# Patient Record
Sex: MALE | URBAN - METROPOLITAN AREA
[De-identification: names, ages, dates, MRNs, and addresses within clinical notes are randomized per-mention and may not be internally consistent; named-entity substitution may affect disease eponyms.]

---

## 2019-01-26 ENCOUNTER — TRANSFERRED RECORDS (OUTPATIENT)
Dept: HEALTH INFORMATION MANAGEMENT | Facility: CLINIC | Age: 39
End: 2019-01-26

## 2019-01-27 ENCOUNTER — HOSPITAL ENCOUNTER (INPATIENT)
Facility: CLINIC | Age: 39
LOS: 5 days | Discharge: HOME | DRG: 885 | End: 2019-02-01
Attending: PSYCHIATRY & NEUROLOGY | Admitting: PSYCHIATRY & NEUROLOGY
Payer: MEDICARE

## 2019-01-27 PROBLEM — F20.0 PARANOID SCHIZOPHRENIA (H): Status: ACTIVE | Noted: 2019-01-27

## 2019-01-27 PROCEDURE — 25000132 ZZH RX MED GY IP 250 OP 250 PS 637: Mod: GY | Performed by: PSYCHIATRY & NEUROLOGY

## 2019-01-27 PROCEDURE — A9270 NON-COVERED ITEM OR SERVICE: HCPCS | Mod: GY | Performed by: PSYCHIATRY & NEUROLOGY

## 2019-01-27 PROCEDURE — 99223 1ST HOSP IP/OBS HIGH 75: CPT | Mod: AI | Performed by: PSYCHIATRY & NEUROLOGY

## 2019-01-27 PROCEDURE — 12400001 ZZH R&B MH UMMC

## 2019-01-27 PROCEDURE — G0177 OPPS/PHP; TRAIN & EDUC SERV: HCPCS

## 2019-01-27 RX ORDER — ALUMINA, MAGNESIA, AND SIMETHICONE 2400; 2400; 240 MG/30ML; MG/30ML; MG/30ML
30 SUSPENSION ORAL EVERY 4 HOURS PRN
Status: DISCONTINUED | OUTPATIENT
Start: 2019-01-27 | End: 2019-02-01 | Stop reason: HOSPADM

## 2019-01-27 RX ORDER — BISACODYL 10 MG
10 SUPPOSITORY, RECTAL RECTAL DAILY PRN
Status: DISCONTINUED | OUTPATIENT
Start: 2019-01-27 | End: 2019-02-01 | Stop reason: HOSPADM

## 2019-01-27 RX ORDER — ACETAMINOPHEN 325 MG/1
650 TABLET ORAL EVERY 4 HOURS PRN
Status: DISCONTINUED | OUTPATIENT
Start: 2019-01-27 | End: 2019-02-01 | Stop reason: HOSPADM

## 2019-01-27 RX ORDER — TRAZODONE HYDROCHLORIDE 50 MG/1
50 TABLET, FILM COATED ORAL
Status: DISCONTINUED | OUTPATIENT
Start: 2019-01-27 | End: 2019-02-01 | Stop reason: HOSPADM

## 2019-01-27 RX ORDER — OLANZAPINE 10 MG/1
10 TABLET ORAL
Status: DISCONTINUED | OUTPATIENT
Start: 2019-01-27 | End: 2019-02-01 | Stop reason: HOSPADM

## 2019-01-27 RX ORDER — OLANZAPINE 10 MG/2ML
10 INJECTION, POWDER, FOR SOLUTION INTRAMUSCULAR
Status: DISCONTINUED | OUTPATIENT
Start: 2019-01-27 | End: 2019-02-01 | Stop reason: HOSPADM

## 2019-01-27 RX ORDER — HYDROXYZINE HYDROCHLORIDE 25 MG/1
25 TABLET, FILM COATED ORAL EVERY 4 HOURS PRN
Status: DISCONTINUED | OUTPATIENT
Start: 2019-01-27 | End: 2019-02-01 | Stop reason: HOSPADM

## 2019-01-27 RX ADMIN — NICOTINE POLACRILEX 2 MG: 2 GUM, CHEWING BUCCAL at 09:45

## 2019-01-27 RX ADMIN — NICOTINE POLACRILEX 2 MG: 2 GUM, CHEWING BUCCAL at 17:38

## 2019-01-27 RX ADMIN — NICOTINE POLACRILEX 2 MG: 2 GUM, CHEWING BUCCAL at 13:40

## 2019-01-27 RX ADMIN — NICOTINE POLACRILEX 4 MG: 2 GUM, CHEWING BUCCAL at 16:17

## 2019-01-27 RX ADMIN — NICOTINE POLACRILEX 2 MG: 2 GUM, CHEWING BUCCAL at 12:30

## 2019-01-27 RX ADMIN — NICOTINE POLACRILEX 4 MG: 2 GUM, CHEWING BUCCAL at 18:23

## 2019-01-27 RX ADMIN — NICOTINE POLACRILEX 2 MG: 2 GUM, CHEWING BUCCAL at 11:08

## 2019-01-27 ASSESSMENT — ACTIVITIES OF DAILY LIVING (ADL)
COGNITION: 0 - NO COGNITION ISSUES REPORTED
AMBULATION: 0-->INDEPENDENT
TRANSFERRING: 0-->INDEPENDENT
RETIRED_COMMUNICATION: 0-->UNDERSTANDS/COMMUNICATES WITHOUT DIFFICULTY
DRESS: 0-->INDEPENDENT
DRESS: INDEPENDENT
SWALLOWING: 0-->SWALLOWS FOODS/LIQUIDS WITHOUT DIFFICULTY
ORAL_HYGIENE: INDEPENDENT
HYGIENE/GROOMING: INDEPENDENT
BATHING: 0-->INDEPENDENT
TOILETING: 0-->INDEPENDENT
FALL_HISTORY_WITHIN_LAST_SIX_MONTHS: NO
RETIRED_EATING: 0-->INDEPENDENT

## 2019-01-27 ASSESSMENT — MIFFLIN-ST. JEOR
SCORE: 1857.92
SCORE: 1846.58

## 2019-01-27 NOTE — PROGRESS NOTES
The pt was calm with blunted affect, visible in the milieu, social with peers and staff. He endorses paranoid thoughts but states he feels safe in the hospital. Pt has some insight into his illness and paranoia. Denies depression, anxiety, hallucinations or SI/SIB/HI thoughts. His goal today was to let his parents know he is in the hospital.      01/27/19 1500   Behavioral Health   Hallucinations denies / not responding to hallucinations   Thinking paranoid   Orientation person: oriented;place: oriented   Memory baseline memory   Insight insight appropriate to situation   Judgement impaired   Eye Contact at examiner   Affect blunted, flat   Mood mood is calm   Physical Appearance/Attire attire appropriate to age and situation   Hygiene neglected grooming - unclean body, hair, teeth   Suicidality other (see comments)  (Denies)   1. Wish to be Dead No   2. Non-Specific Active Suicidal Thoughts  No   Self Injury other (see comment)  (Denies)   Elopement (Nothing stated or observed)   Activity other (see comment)  (Visible in the milieu, social with peers and staff)   Speech clear;coherent   Medication Sensitivity no stated side effects;no observed side effects   Psychomotor / Gait balanced;steady
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
## 2019-01-27 NOTE — PLAN OF CARE
Initially seen by OT on this date.  Kiran attended a relaxation yoga group this a.m. Appeared internally preoccupied. Quiet and tense. No active participation in the guided stretches and movements.  More observation needed to complete initial evaluation at this time.
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
## 2019-01-27 NOTE — PHARMACY-ADMISSION MEDICATION HISTORY
Admission medication history for the January 27, 2019 admission is complete.     Interview sources:  Patient    Reliability of source: Patient was a moderate historian, could remember medication and narrow down to a couple days of when his last dose was    Medication compliance: Good, patient goes into a clinic for his monthly injections    Preferred Outpatient Pharmacy: [facility]    Changes made to PTA medication list (reason)  Added: None  Deleted: None  Changed: None    Additional medication history information:   1. Paliperidone 156 mg/mL   Patient reports last receiving the injection on either the 1st or 2nd. He reported that his next dose was scheduled to be on January 31st. He stated he goes to [facility] for his injections.     Prior to Admission Medication List:  Prior to Admission medications    Medication Sig Last Dose Taking? Auth Provider   paliperidone (INVEGA SUSTENNA) 156 MG/ML SUSP injection Inject 156 mg into the muscle every 28 days 1/1/2019 at n/a Yes Reported, Patient       Time spent: 30 minutes    Medication history completed by:   Sarahy Lam, Pharmacy Intern

## 2019-01-27 NOTE — PROGRESS NOTES
01/27/19 0214   Patient Belongings   Did you bring any home meds/supplements to the hospital?  No   Patient Belongings locker;sent to security per site process   Patient Belongings Put in Hospital Secure Location (Security or Locker, etc.) wallet;watch;clothing;cash/credit card   Belongings Search Yes   Clothing Search Yes   Second Staff Ron BRENNAN     In Locker:  Pair of jeans with belt, gray sweater, gray T-shirt, pair of socks, wallet, mini composition book, key, pair of shoes, cell phone, jacket    Black gym bag containing pen, lighters (3), tobacco pipe, bag of tobacco, detergent, toothpaste (2), toothbrush, pink comb, charging cables (2), [employer] bag, blue pouch, pair of jeans (3), pairs of socks (9), solo sock,  2 long sleeve shirts, plaid button up shirts (2), bath towels (2), bundle of hand towel and wash cloths, kitchen towel, pair of shorts, T-Shirts (4), 2019 planner, cards (2), can of Planters Peanuts (3)    To Security:  Envelope [id number]  Saul $15.19  [employer] Mastercard [id number]  Arch Card [id number]  [employer] Watch    Contraband:  Large bag of tobacco    A               Admission:  I am responsible for any personal items that are not sent to the safe or pharmacy.  [facility] is not responsible for loss, theft or damage of any property in my possession.    Signature:  _________________________________ Date: _______  Time: _____                                              Staff Signature:  ____________________________ Date: ________  Time: _____      2nd Staff person, if patient is unable/unwilling to sign:    Signature: ________________________________ Date: ________  Time: _____     Discharge:  [facility] has returned all of my personal belongings:    Signature: _________________________________ Date: ________  Time: _____                                          Staff Signature:  ____________________________ Date: ________  Time: _____
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
## 2019-01-28 LAB
ALBUMIN SERPL-MCNC: 3.8 G/DL (ref 3.4–5)
ALP SERPL-CCNC: 74 U/L (ref 40–150)
ALT SERPL W P-5'-P-CCNC: 21 U/L (ref 0–70)
ANION GAP SERPL CALCULATED.3IONS-SCNC: 5 MMOL/L (ref 3–14)
AST SERPL W P-5'-P-CCNC: 17 U/L (ref 0–45)
BASOPHILS # BLD AUTO: 0 10E9/L (ref 0–0.2)
BASOPHILS NFR BLD AUTO: 0.5 %
BILIRUB SERPL-MCNC: 0.4 MG/DL (ref 0.2–1.3)
BUN SERPL-MCNC: 17 MG/DL (ref 7–30)
CALCIUM SERPL-MCNC: 9 MG/DL (ref 8.5–10.1)
CHLORIDE SERPL-SCNC: 108 MMOL/L (ref 94–109)
CHOLEST SERPL-MCNC: 158 MG/DL
CO2 SERPL-SCNC: 27 MMOL/L (ref 20–32)
CREAT SERPL-MCNC: 1.04 MG/DL (ref 0.66–1.25)
DIFFERENTIAL METHOD BLD: NORMAL
EOSINOPHIL # BLD AUTO: 0.1 10E9/L (ref 0–0.7)
EOSINOPHIL NFR BLD AUTO: 1.6 %
ERYTHROCYTE [DISTWIDTH] IN BLOOD BY AUTOMATED COUNT: 12.5 % (ref 10–15)
GFR SERPL CREATININE-BSD FRML MDRD: 89 ML/MIN/{1.73_M2}
GLUCOSE SERPL-MCNC: 95 MG/DL (ref 70–99)
HCT VFR BLD AUTO: 49.3 % (ref 40–53)
HDLC SERPL-MCNC: 43 MG/DL
HGB BLD-MCNC: 16.5 G/DL (ref 13.3–17.7)
IMM GRANULOCYTES # BLD: 0 10E9/L (ref 0–0.4)
IMM GRANULOCYTES NFR BLD: 0.2 %
LDLC SERPL CALC-MCNC: 87 MG/DL
LYMPHOCYTES # BLD AUTO: 2.1 10E9/L (ref 0.8–5.3)
LYMPHOCYTES NFR BLD AUTO: 32.2 %
MCH RBC QN AUTO: 30.1 PG (ref 26.5–33)
MCHC RBC AUTO-ENTMCNC: 33.5 G/DL (ref 31.5–36.5)
MCV RBC AUTO: 90 FL (ref 78–100)
MONOCYTES # BLD AUTO: 0.5 10E9/L (ref 0–1.3)
MONOCYTES NFR BLD AUTO: 7.1 %
NEUTROPHILS # BLD AUTO: 3.7 10E9/L (ref 1.6–8.3)
NEUTROPHILS NFR BLD AUTO: 58.4 %
NONHDLC SERPL-MCNC: 115 MG/DL
NRBC # BLD AUTO: 0 10*3/UL
NRBC BLD AUTO-RTO: 0 /100
PLATELET # BLD AUTO: 271 10E9/L (ref 150–450)
POTASSIUM SERPL-SCNC: 4.8 MMOL/L (ref 3.4–5.3)
PROT SERPL-MCNC: 7 G/DL (ref 6.8–8.8)
RBC # BLD AUTO: 5.49 10E12/L (ref 4.4–5.9)
SODIUM SERPL-SCNC: 140 MMOL/L (ref 133–144)
TRIGL SERPL-MCNC: 141 MG/DL
TSH SERPL DL<=0.005 MIU/L-ACNC: 1.63 MU/L (ref 0.4–4)
WBC # BLD AUTO: 6.4 10E9/L (ref 4–11)

## 2019-01-28 PROCEDURE — A9270 NON-COVERED ITEM OR SERVICE: HCPCS | Mod: GY | Performed by: PSYCHIATRY & NEUROLOGY

## 2019-01-28 PROCEDURE — 36415 COLL VENOUS BLD VENIPUNCTURE: CPT | Performed by: PSYCHIATRY & NEUROLOGY

## 2019-01-28 PROCEDURE — 85025 COMPLETE CBC W/AUTO DIFF WBC: CPT | Performed by: PSYCHIATRY & NEUROLOGY

## 2019-01-28 PROCEDURE — 99207 ZZC CDG-DOWN CODE MED NECESSITY: CPT | Performed by: PHYSICIAN ASSISTANT

## 2019-01-28 PROCEDURE — 12400001 ZZH R&B MH UMMC

## 2019-01-28 PROCEDURE — 80053 COMPREHEN METABOLIC PANEL: CPT | Performed by: PSYCHIATRY & NEUROLOGY

## 2019-01-28 PROCEDURE — 25000132 ZZH RX MED GY IP 250 OP 250 PS 637: Mod: GY | Performed by: PSYCHIATRY & NEUROLOGY

## 2019-01-28 PROCEDURE — 80061 LIPID PANEL: CPT | Performed by: PSYCHIATRY & NEUROLOGY

## 2019-01-28 PROCEDURE — 84443 ASSAY THYROID STIM HORMONE: CPT | Performed by: PSYCHIATRY & NEUROLOGY

## 2019-01-28 PROCEDURE — 99232 SBSQ HOSP IP/OBS MODERATE 35: CPT | Performed by: PSYCHIATRY & NEUROLOGY

## 2019-01-28 RX ADMIN — NICOTINE POLACRILEX 2 MG: 2 GUM, CHEWING BUCCAL at 12:44

## 2019-01-28 RX ADMIN — NICOTINE POLACRILEX 2 MG: 2 GUM, CHEWING BUCCAL at 07:46

## 2019-01-28 RX ADMIN — NICOTINE POLACRILEX 2 MG: 2 GUM, CHEWING BUCCAL at 14:17

## 2019-01-28 RX ADMIN — NICOTINE POLACRILEX 4 MG: 2 GUM, CHEWING BUCCAL at 16:25

## 2019-01-28 RX ADMIN — NICOTINE POLACRILEX 2 MG: 2 GUM, CHEWING BUCCAL at 09:25

## 2019-01-28 SDOH — HEALTH STABILITY: MENTAL HEALTH: HOW OFTEN DO YOU HAVE A DRINK CONTAINING ALCOHOL?: NEVER

## 2019-01-28 ASSESSMENT — ACTIVITIES OF DAILY LIVING (ADL)
ORAL_HYGIENE: INDEPENDENT
DRESS: INDEPENDENT
LAUNDRY: WITH SUPERVISION
ORAL_HYGIENE: INDEPENDENT
HYGIENE/GROOMING: INDEPENDENT
DRESS: INDEPENDENT
HYGIENE/GROOMING: INDEPENDENT

## 2019-01-28 NOTE — PHARMACY
Aroldo Pat is a 40 yo patient admitted to unit 10 in the [facility] for worsening anxiety and possible decompensation of schizophrenia.     Past Medical History:   Diagnosis Date     Schizophrenia (H)      Spoke with staff at [facility] (phone [phone] and fax [phone]) to verify dose of Invega Sustenna and last date of administration.    Invega Sustenna 156 mg IM given last on 01/02/19 so is due 01/30/19    Reviewed the following criteria for Aroldo:    The following conditions must be met to dispense Invega Sustenna:  1. Patient has been on Invega Sustenna at least 3 weeks prior to admission.  2. Patient has received both initiation doses.  3. Dose will be administered one week after the next scheduled outpatient maintenance dose is due.  4. The patient must still be hospitalized on the administration date determined by the pharmacy consult (i.e., one week after the next scheduled outpatient dose).  5. Order must include  dispense as written.   6. If six months or more have passed since the last maintenance dose, do not restart Invega Sustenna in the hospital, defer to outpatient treatment.    Nick meets the above conditions so Invega Sustenna will be scheduled one week after the due date of the outpatient dose (1/30/19) so will be ordered to be administered on 02/06/19.    Several notes reported issues with insurance coverage of Invega Sustenna outpatient. Aroldo confirmed he would have to pay a monthly co pay which he cannot afford. The [facility] reported Aroldo recently started coming to the clinic to get his Invega Sustenna injection and the co pay for the medication has been an issue from when he started attending the clinic (2-3 months prior to admission).    Christine Lee, PharmD, [facility]  Behavioral Health Pharmacist

## 2019-01-28 NOTE — PLAN OF CARE
"  OT General Care Plan  OT Frequency  1/28/2019 1500 - No Change by Christine Puentes, OT     Groups Attended: OT Clinic and Wellness groups for approximately x20 minutes each, observed only (no charge).     Affect/Hygiene/Presentation: Pt was pleasant and observed only for short spans of time, no apparent hygiene concerns. Flat affect.     Patient Performance/Response:  -Clinic: Pt passively participated in part of occupational therapy clinic. He indicated to writer that he wanted to work on a project, and that he likes to draw. However, as writer was assisting him to gather necessary materials, he indicated he would rather \"do what he needs to do first\". When writer asked what he meant, Pt noted that he needed to call his  prior to engaging in other activities. Pt completed self-assessment while present in clinic. Pt appeared comfortable minimally interacting writer when conversation was initiated with him, however he did not interact with peers.   -Wellness: Pt passively participated in part of a mental health management group with a focus on coping through movement to facilitate relaxation and stress management via chair yoga. Pt observed peers and writer engaging in yoga poses, however did not actively participate.     Plan: Will continue to assess, initial assessment and OT care plan/goals to be initiated upon additional group participation.       OT SELF-ASSESSMENT  Pt completed a self-assessment form this date. OT staff reviewed with Pt and explained the value of having them involved in their treatment plan, and provided options to meet current needs/self-identified goals.     What do you do during the day/evening?   \"Enjoy my time, coping skills, daily activities\"     What stressors do you face that trigger symptoms/use?  \"Money problems\"    Who are supportive people who you enjoy spending time with?  \"Parents, neighbors\"     What are your positive qualities/strengths?  None identified " "    What helps you to calm down or relax?  \"Cooking, exercise, music, hanging out with people\"    Please select coping skills that you would like to explore in OT:  -Physical wellness/exercise/yoga/Maltese chi  -Cooking/baking    What are your goals for when you leave the hospital?  None identified    Please select goals that you would like to work on in OT to reach your goal:  -Work on self-care to improve wellness  -Practice using coping strategies to manage stress and reduce symptoms  -Increase confidence and self-esteem     "

## 2019-01-28 NOTE — PROGRESS NOTES
Patient lost his MA because his SSDI payments increased.  His only Payor is Medicare A for hospitalization.  He asked me if the hospital would open up MA in [facility] so he could go to a place in [place] called [facility].  He has been paying to live at the [facility] in [place] prior to admit and his rent is due Sunday.  I let him know that our Business Office does not do MA applications as Medicare pays his hospitalization.  I told him that he needs to talk to Rakesh Rand, his [place] [employer]  to see if she could arrange a ride for him to return to [place] as we have no transportation for him.  If he wants [facility], he needs to work with Rakesh about getting MA reinstated and return to [place].

## 2019-01-28 NOTE — PLAN OF CARE
"Behavior Regulation Impairment (Disruptive Behavior)  Improved Impulse and Aggression Control (Disruptive Behavior)  1/28/2019 1436 - Improving   Flowsheets  Taken 1/28/2019 1436   Mutually Determined Action Steps (Promote Impulse/Aggression Control)  verbalizes insight re: need for meds  Promote Impulse and Aggression Control  1/28/2019 1436   Flowsheets  Taken 1/28/2019 1436   De-Escalation Techniques  diversional activity encouraged;appropriate behavior reinforced;physical activity promoted  Note  Patient has been visible in the milieu, minimally social with peers and staff. Has been pacing the halls for much of the shift. Affect is flat, blunted. Primary concern is access to invega sustenna following discharge as his insurance no longer covers this. States he knew he needed to ask for help when he was considering staying in a shelter for a few months to save money for his medication. States his mood is \"pretty good.\" Denies depression, anxiety, auditory and visual hallucinations, suicidal, homicidal ideation. States his only mental health symptom is \"frustration\" but he states this has improved since admission. No behavioral concerns this shift.      "

## 2019-01-28 NOTE — PROGRESS NOTES
Patient spent most to all shift withdrawn in p[place]on or room. His goal was to talk to him mom and he did.  Denies SI SIB Depression and Anxiety.  No pain and eating and sleeping fine.  Still admits to being paranoid about things.  No other concerns     01/27/19 2049   Behavioral Health   Hallucinations denies / not responding to hallucinations   Thinking distractable   Orientation time: oriented;date: oriented;place: oriented;person: oriented   Memory baseline memory   Insight insight appropriate to situation   Judgement impaired   Eye Contact at examiner   Affect blunted, flat   Mood mood is calm   Physical Appearance/Attire attire appropriate to age and situation   Hygiene neglected grooming - unclean body, hair, teeth   Suicidality other (see comments)  (denies)   1. Wish to be Dead No

## 2019-01-28 NOTE — PLAN OF CARE
Participants: Dr. Alonzo Naylor; Rosy LAWRENCE; Lyn Duff RN    Patient needs further stablization    New Patient    Medical: See Consult    Patient needs medication management and stabilization of his mood and other symptoms.  He will be encouraged to participate in groups when stable. CTC to ensure discharge appointments are in place.

## 2019-01-28 NOTE — CONSULTS
Internal Medicine Initial Visit      Aroldo Pat MRN# [id number]   YOB: 1980 Age: 39 year old   Date of Admission: 1/27/2019  PCP: No primary care provider on file.    Referring Provider: Behavioral Health - Arturo Melgoza MD  Reason for Visit: General Medical Evaluation          Assessment and Recommendations:   Aroldo Pat is a 39 year old year old man with a history of schizophrenia who is admitted to [facility] after transfer from [facility] in [place], [place] with worsening anxiety and possible decompensation of schizophrenia. Internal Medicine consultation was ordered by Dr. Arturo Melgoza for general medical evaluation.       Hx of paranoid schizophrenia  Anxiety: On PTA Paliperidone injection; last injection 12/30 per patient. Was transferred from [facility] in [place]. He reports increased frustration and stress due to his change in insurance, a current birthday, as well as less social support at home. No SI/HI. No hallucinations. CBC, CMP, TSH and Lipid panel WNL.  -Management per psychiatry team.     No further medical intervention is required at this time. Medicine signing off. Please feel free to call with any questions.     Courtney Causey PA-C  Hospitalist Service   Pager: [phone]     Reason for Admission:   Worsening anxiety and possible decompensation of schizophrenia          History of Present Illness:   History is obtained from the patient and medical record.     Aroldo Pat is a 39 year old year old man with a history of schizophrenia who is admitted to [facility] after transfer from [facility] in [place], [place] with worsening anxiety and possible decompensation of schizophrenia. Internal Medicine consultation was ordered by Dr. Arturo Melgoza for general medical evaluation.    Currently, patient denies any health concerns at this time. He states he is here because he was feeling  more frustrated and anxious due to his for[age] birthday, having less social support, not having an 'Ar[employer]' worker, his co-pays increasing and his insurance changing. He denies any SI/HI or hallicinations. He states that he was previously on paliperidone injections which are getting more expensive for him to pay for. He states he has gained 20-30lbs over the last few months due to inactivity. He cannot drive, so he states the winter and side walk conditions have made it difficult for him to be active.     Otherwise, he denies any chest pain, SOB, dizziness, lightheadedness, abdominal pain, nausea/vomiting or change in bowel/bladder function. He denies any previous medical problems including heart disease, HTN, HLD, diabetes or lung disease. He smokes 10 cigarettes per day for 20 years. Is wanting to quit for his [age] birthday. States the nicotine gum is helpful. He denies any other alcohol or illicit drug use.           Review of Systems:   Constitutional: negative for fever/chills or recent weight changes   Eyes: negative for vision changes   Ears/Nose/Throat: negative for ear pain, sore throat, nasal or sinus congestion   Cardiovascular: negative for chest pain or palpitations   Respiratory: negative for cough or shortness of breath   Gastrointestinal: negative for abdominal pain, N/V, diarrhea or constipation   Genitourinary: negative for dysuria, urinary urgency or frequency   Musculoskeletal: negative for joint pain   Neurologic: negative for headaches or numbness, tingling, weakness of extremities   Skin: negative for rashes or wounds   Endocrine: negative for polydipsia, polyphagia, polyuria; negative for heat/cold intolerance           Past Medical History:   Reviewed and updated in Epic.  Past Medical History:   Diagnosis Date     Schizophrenia (H)              Past Surgical History:   Reviewed and updated in Epic.  Past Surgical History:   Procedure Laterality Date     wisdom teeth extraction             "   Social History:     Social History     Tobacco Use     Smoking status: Current Every Day Smoker     Packs/day: 0.50     Years: 20.00     Pack years: 10.00     Types: Cigarettes   Substance Use Topics     Alcohol use: No     Frequency: Never     Drug use: No     Currently lives in [facility] in [place], [place] by himself. Has family nearby. Does not work.         Family History:   Reviewed and updated in Epic.  Family History   Adopted: Yes   Problem Relation Age of Onset     No Known Problems Mother      No Known Problems Father      No Known Problems Sister      No Known Problems Brother      No Known Problems Brother              Allergies:     Allergies   Allergen Reactions     Haloperidol              Medications:     Medications Prior to Admission   Medication Sig Dispense Refill Last Dose     paliperidone (INVEGA SUSTENNA) 156 MG/ML SUSP injection Inject 156 mg into the muscle every 28 days   1/1/2019 at n/a        Current Facility-Administered Medications   Medication     acetaminophen (TYLENOL) tablet 650 mg     alum & mag hydroxide-simethicone (MYLANTA ES/MAALOX  ES) suspension 30 mL     bisacodyl (DULCOLAX) Suppository 10 mg     hydrOXYzine (ATARAX) tablet 25 mg     magnesium hydroxide (MILK OF MAGNESIA) suspension 30 mL     nicotine (NICORETTE) gum 2-4 mg     OLANZapine (zyPREXA) tablet 10 mg    Or     OLANZapine (zyPREXA) injection 10 mg     [START ON 1/30/2019] paliperidone (INVEGA SUSTENNA) injection SUSP 156 mg     traZODone (DESYREL) tablet 50 mg            Physical Exam:   Blood pressure 115/70, pulse 71, temperature 97.8  F (36.6  C), temperature source Tympanic, resp. rate 16, height 1.778 m (5' 10\"), weight 93 kg (205 lb 1.6 oz), SpO2 96 %.  Body mass index is 29.43 kg/m .  GENERAL: Alert and oriented x 3. In no acute distress.   HEENT: Anicteric sclera. Mucous membranes moist.   CV: RRR. S1, S2. No murmurs appreciated.   RESPIRATORY: Effort normal on room air. Lungs CTAB with no " wheezing, rales, rhonchi.   GI: Abdomen soft, non distended, non tender. No guarding, rigidity or rebound tenderness.  MUSCULOSKELETAL: No joint swelling or tenderness.  NEUROLOGICAL: No focal deficits. Moves all extremities.   EXTREMITIES: No peripheral edema. Intact bilateral pedal pulses.   SKIN: No jaundice. No rashes.           Data:   CBC:  Recent Labs   Lab Test 01/28/19  0743   WBC 6.4   RBC 5.49   HGB 16.5   HCT 49.3   MCV 90   MCH 30.1   MCHC 33.5   RDW 12.5          CMP:  No results for input(s): NA, POTASSIUM, CHLORIDE, PALAK, CO2, BUN, CR, GLC, AST, ALT, BILITOTAL, ALBUMIN, PROTTOTAL, ALKPHOS in the last 12622 hours.    TSH:  TSH   Date Value Ref Range Status   01/28/2019 1.63 0.40 - 4.00 mU/L Final       Tox screen: n/a    Unresulted Labs Ordered in the Past 30 Days of this Admission     No orders found for last 61 day(s).

## 2019-01-28 NOTE — PROGRESS NOTES
"[facility], [place]   Psychiatric Progress Note        Interim History:   The patient's care was discussed with the treatment team during the daily team meeting and/or staff's chart notes were reviewed.  Staff report patient has been \"a little paranoid\" but wants to be here and receive treatment.     The patient reports that he feels that his Invega Sustenna is \"working.\" Some concerns about paying for it but does say that he will be able to afford it if he had better housing. Has been staying at an extended-stay hotel. Says that he was told to apply for an [employer] worker and look at a board and lodge. Does have a . Mood is \"a little frustrated\" due to the cost of Invega. Sleeping and eating well. Denies SI or HI. Denies AH or VH. Says that he hasn't been able to exercise as much as he would like to in his current living situation.          Medications:       [START ON 1/30/2019] paliperidone  156 mg Intramuscular Q28 Days          Allergies:     Allergies   Allergen Reactions     Haloperidol           Labs:     Recent Results (from the past 24 hour(s))   Lipid panel    Collection Time: 01/28/19  7:43 AM   Result Value Ref Range    Cholesterol 158 <200 mg/dL    Triglycerides 141 <150 mg/dL    HDL Cholesterol 43 >39 mg/dL    LDL Cholesterol Calculated 87 <100 mg/dL    Non HDL Cholesterol 115 <130 mg/dL   TSH with free T4 reflex    Collection Time: 01/28/19  7:43 AM   Result Value Ref Range    TSH 1.63 0.40 - 4.00 mU/L   Comprehensive metabolic panel    Collection Time: 01/28/19  7:43 AM   Result Value Ref Range    Sodium 140 133 - 144 mmol/L    Potassium 4.8 3.4 - 5.3 mmol/L    Chloride 108 94 - 109 mmol/L    Carbon Dioxide 27 20 - 32 mmol/L    Anion Gap 5 3 - 14 mmol/L    Glucose 95 70 - 99 mg/dL    Urea Nitrogen 17 7 - 30 mg/dL    Creatinine 1.04 0.66 - 1.25 mg/dL    GFR Estimate 89 >60 mL/min/[1.73_m2]    GFR Estimate If Black >90 >60 mL/min/[1.73_m2]    Calcium 9.0 8.5 - " "10.1 mg/dL    Bilirubin Total 0.4 0.2 - 1.3 mg/dL    Albumin 3.8 3.4 - 5.0 g/dL    Protein Total 7.0 6.8 - 8.8 g/dL    Alkaline Phosphatase 74 40 - 150 U/L    ALT 21 0 - 70 U/L    AST 17 0 - 45 U/L   CBC with platelets differential    Collection Time: 01/28/19  7:43 AM   Result Value Ref Range    WBC 6.4 4.0 - 11.0 10e9/L    RBC Count 5.49 4.4 - 5.9 10e12/L    Hemoglobin 16.5 13.3 - 17.7 g/dL    Hematocrit 49.3 40.0 - 53.0 %    MCV 90 78 - 100 fl    MCH 30.1 26.5 - 33.0 pg    MCHC 33.5 31.5 - 36.5 g/dL    RDW 12.5 10.0 - 15.0 %    Platelet Count 271 150 - 450 10e9/L    Diff Method Automated Method     % Neutrophils 58.4 %    % Lymphocytes 32.2 %    % Monocytes 7.1 %    % Eosinophils 1.6 %    % Basophils 0.5 %    % Immature Granulocytes 0.2 %    Nucleated RBCs 0 0 /100    Absolute Neutrophil 3.7 1.6 - 8.3 10e9/L    Absolute Lymphocytes 2.1 0.8 - 5.3 10e9/L    Absolute Monocytes 0.5 0.0 - 1.3 10e9/L    Absolute Eosinophils 0.1 0.0 - 0.7 10e9/L    Absolute Basophils 0.0 0.0 - 0.2 10e9/L    Abs Immature Granulocytes 0.0 0 - 0.4 10e9/L    Absolute Nucleated RBC 0.0           Psychiatric Examination:     /70   Pulse 71   Temp 97.8  F (36.6  C) (Tympanic)   Resp 16   Ht 1.778 m (5' 10\")   Wt 93 kg (205 lb 1.6 oz)   SpO2 96%   BMI 29.43 kg/m    Weight is 205 lbs 1.6 oz  Body mass index is 29.43 kg/m .  Orthostatic Vitals       Most Recent      Sitting Orthostatic /82 01/28 0748    Sitting Orthostatic Pulse (bpm) 80 01/28 0748    Standing Orthostatic /76 01/28 0748    Standing Orthostatic Pulse (bpm) 96 01/28 0748            Appearance: awake, alert and adequately groomed  Attitude:  cooperative  Eye Contact:  good  Mood:  \"a little frustrated\"  Affect:  intensity is flat  Speech:  clear, coherent  Psychomotor Behavior:  no evidence of tardive dyskinesia, dystonia, or tics  Thought Process:  goal oriented  Associations:  no loose associations  Thought Content:  no evidence of suicidal ideation or " homicidal ideation and no evidence of psychotic thought  Insight:  fair  Judgement:  fair  Oriented to:  time, person, and place  Attention Span and Concentration:  intact  Recent and Remote Memory:  intact    Clinical Global Impressions  First:  Considering your total clinical experience with this particular patient population, how severe are the patient's symptoms at this time?: 7 (01/27/19 0820)  Compared to the patient's condition at the START of treatment, this patient's condition is:: 4 (01/27/19 0820)  Most recent:  Considering your total clinical experience with this particular patient population, how severe are the patient's symptoms at this time?: 7 (01/27/19 0820)  Compared to the patient's condition at the START of treatment, this patient's condition is:: 4 (01/27/19 0820)         Precautions:     Behavioral Orders   Procedures     Code 1 - Restrict to Unit     Routine Programming     As clinically indicated     Status 15     Every 15 minutes.          Diagnoses:     Schizophrenia, chronic paranoid  Anxiety, unspecified             Plan:     1) Continue Invega Sustenna. Pharmacy can give if it is 7 days overdue (next Wednesday)  2) CTC to coordinate care with outpatient .       Disposition Plan   Reason for ongoing admission: poses an imminent risk to self  Discharge location: TBD  Discharge Medications: not ordered  Follow-up Appointments: not scheduled  Legal Status: voluntary  Entered by: Alonzo Naylor on 1/28/2019 at 10:38 AM

## 2019-01-29 PROCEDURE — 25000132 ZZH RX MED GY IP 250 OP 250 PS 637: Mod: GY | Performed by: PSYCHIATRY & NEUROLOGY

## 2019-01-29 PROCEDURE — A9270 NON-COVERED ITEM OR SERVICE: HCPCS | Mod: GY | Performed by: PSYCHIATRY & NEUROLOGY

## 2019-01-29 PROCEDURE — 99231 SBSQ HOSP IP/OBS SF/LOW 25: CPT | Performed by: PSYCHIATRY & NEUROLOGY

## 2019-01-29 PROCEDURE — 12400001 ZZH R&B MH UMMC

## 2019-01-29 RX ADMIN — NICOTINE POLACRILEX 2 MG: 2 GUM, CHEWING BUCCAL at 07:49

## 2019-01-29 RX ADMIN — NICOTINE POLACRILEX 4 MG: 2 GUM, CHEWING BUCCAL at 18:25

## 2019-01-29 RX ADMIN — NICOTINE POLACRILEX 2 MG: 2 GUM, CHEWING BUCCAL at 08:57

## 2019-01-29 RX ADMIN — NICOTINE POLACRILEX 4 MG: 2 GUM, CHEWING BUCCAL at 15:09

## 2019-01-29 RX ADMIN — NICOTINE POLACRILEX 2 MG: 2 GUM, CHEWING BUCCAL at 12:47

## 2019-01-29 RX ADMIN — NICOTINE POLACRILEX 2 MG: 2 GUM, CHEWING BUCCAL at 10:10

## 2019-01-29 RX ADMIN — NICOTINE POLACRILEX 2 MG: 2 GUM, CHEWING BUCCAL at 17:15

## 2019-01-29 ASSESSMENT — ACTIVITIES OF DAILY LIVING (ADL)
DRESS: STREET CLOTHES
ORAL_HYGIENE: INDEPENDENT
DRESS: SCRUBS (BEHAVIORAL HEALTH);STREET CLOTHES
LAUNDRY: WITH SUPERVISION
HYGIENE/GROOMING: INDEPENDENT
ORAL_HYGIENE: INDEPENDENT
HYGIENE/GROOMING: INDEPENDENT

## 2019-01-29 ASSESSMENT — MIFFLIN-ST. JEOR: SCORE: 1852.47

## 2019-01-29 NOTE — PROGRESS NOTES
Had call from the RN at the patient's mental health clinic in [place], [place].  She scheduled the patient to see Dr. Capps next Tuesday, 2/5 at 10:00am.  They are aware of the issue now with Mandi Callejas. Patient had been getting samples but the clinic has run out.  They will try to get more but will also discuss switching him to a medication he can afford.  RN said that she knows his , Rakesh, very well and will discuss the issues he is having with MA being closed and his living situation. Dr. Capps was surprised that [facility] would send him up here to [facility] where he has no support system and no way to get his medication.    RN from [facility] called back and gave me a number to have the patient call as soon as he arrives in [place].  The number is to the local crisis team.  They will pick him up and bring him directly to Dr. Capps's office.  Dr. Capps able to get more Invega Britta and wants to talk to patient about medications and try and assess what more he needs.

## 2019-01-29 NOTE — PROGRESS NOTES
Pt has been in the milieu.  He has been attending groups but has minimal interaction with peers.  Pt denies any mental health concerns.  Pt denies any SI or SIB thinking.  He reports he feels his medications are helping.  Pt anticipates discharge tomorrow.     01/29/19 1307   Behavioral Health   Hallucinations denies / not responding to hallucinations   Thinking distractable   Orientation person: oriented;place: oriented;date: oriented   Insight (fair)   Judgement (fair)   Eye Contact at examiner   Affect blunted, flat   Mood mood is calm   Physical Appearance/Attire attire appropriate to age and situation   Hygiene (adequate)   Suicidality (denies SI)   1. Wish to be Dead Yes   2. Non-Specific Active Suicidal Thoughts  No   Self Injury (denies SIB thinking)   Elopement (none observed)   Activity (in the milieu/some peer interaction/attended groups)   Speech clear;coherent   Psychomotor / Gait balanced;steady   Activities of Daily Living   Hygiene/Grooming independent   Oral Hygiene independent   Dress street clothes   Room Organization independent

## 2019-01-29 NOTE — PROGRESS NOTES
Team discussed the situation with patient being transferred to us from a [place] ED.  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  Left a message with his Co CM Rakesh Rand. Explained to patient that we can't open MA as Medicare A is covering his hospital bill and that he needs to work with [facility] to open up his MA which he said was closed because he earns too much money and now cannot afford his Invega Sustenna. Called his clinic to let them know he will need his injection or be switched to something he can afford as he cannot get it through [facility] as it is not available here. Also let them know that patient will be directed to come and see them to discuss his predicament not being able to afford his new co-pay for it.  Patient spoke to his mother and this writer called his mother to confirm that she will have a [employer] Bus Ticket at [employer] for him with a Departure Time of 12:05 to [place].  Patient needs to be cabbed to the [employer] at 10:30am in order to  his ticket at [employer].  Mom said the bus then drops him off right outside his apartment.

## 2019-01-29 NOTE — DISCHARGE SUMMARY
Psychiatric Discharge Summary    Aroldo Pat MRN# [id number]   Age: 39 year old YOB: 1980     Date of Admission:  1/27/2019  Date of Discharge:  1/30/2019 at 5 AM  Admitting Physician:  Arturo Melgoza MD  Discharge Physician:  Arturo Melgoza MD         Event Leading to Hospitalization:   This patient was admitted to station 10 from [facility] in [place] due to concerns about possible decompensation.       See Admission note by Cesia Jay MD on 1/27/19 for additional details.          Diagnoses:     Schizophrenia, chronic paranoid  Anxiety, unspecified         Labs:     Results for orders placed or performed during the hospital encounter of 01/27/19   Lipid panel   Result Value Ref Range    Cholesterol 158 <200 mg/dL    Triglycerides 141 <150 mg/dL    HDL Cholesterol 43 >39 mg/dL    LDL Cholesterol Calculated 87 <100 mg/dL    Non HDL Cholesterol 115 <130 mg/dL   TSH with free T4 reflex   Result Value Ref Range    TSH 1.63 0.40 - 4.00 mU/L   Comprehensive metabolic panel   Result Value Ref Range    Sodium 140 133 - 144 mmol/L    Potassium 4.8 3.4 - 5.3 mmol/L    Chloride 108 94 - 109 mmol/L    Carbon Dioxide 27 20 - 32 mmol/L    Anion Gap 5 3 - 14 mmol/L    Glucose 95 70 - 99 mg/dL    Urea Nitrogen 17 7 - 30 mg/dL    Creatinine 1.04 0.66 - 1.25 mg/dL    GFR Estimate 89 >60 mL/min/[1.73_m2]    GFR Estimate If Black >90 >60 mL/min/[1.73_m2]    Calcium 9.0 8.5 - 10.1 mg/dL    Bilirubin Total 0.4 0.2 - 1.3 mg/dL    Albumin 3.8 3.4 - 5.0 g/dL    Protein Total 7.0 6.8 - 8.8 g/dL    Alkaline Phosphatase 74 40 - 150 U/L    ALT 21 0 - 70 U/L    AST 17 0 - 45 U/L   CBC with platelets differential   Result Value Ref Range    WBC 6.4 4.0 - 11.0 10e9/L    RBC Count 5.49 4.4 - 5.9 10e12/L    Hemoglobin 16.5 13.3 - 17.7 g/dL    Hematocrit 49.3 40.0 - 53.0 %    MCV 90 78 - 100 fl    MCH 30.1 26.5 - 33.0 pg    MCHC 33.5 31.5 - 36.5 g/dL    RDW 12.5 10.0 - 15.0 %    Platelet  Count 271 150 - 450 10e9/L    Diff Method Automated Method     % Neutrophils 58.4 %    % Lymphocytes 32.2 %    % Monocytes 7.1 %    % Eosinophils 1.6 %    % Basophils 0.5 %    % Immature Granulocytes 0.2 %    Nucleated RBCs 0 0 /100    Absolute Neutrophil 3.7 1.6 - 8.3 10e9/L    Absolute Lymphocytes 2.1 0.8 - 5.3 10e9/L    Absolute Monocytes 0.5 0.0 - 1.3 10e9/L    Absolute Eosinophils 0.1 0.0 - 0.7 10e9/L    Absolute Basophils 0.0 0.0 - 0.2 10e9/L    Abs Immature Granulocytes 0.0 0 - 0.4 10e9/L    Absolute Nucleated RBC 0.0    Internal Medicine Adult IP Consult for [facility] in [facility]: Patient to be seen: Routine within 24 hrs; Call back #: [phone]; Will need H&P; Consultant may enter orders: Yes    Narrative    Courtney Causey PA-C     1/28/2019 10:36 AM      Internal Medicine Initial Visit      Aroldo Pat MRN# [id number]   YOB: 1980 Age: 39 year old   Date of Admission: 1/27/2019  PCP: No primary care provider on file.    Referring Provider: Behavioral Health - Arturo Melgoza MD  Reason for Visit: General Medical Evaluation          Assessment and Recommendations:   Aroldo Pat is a 39 year old year old man with a   history of schizophrenia who is admitted to station 10 after   transfer from [facility] in [place], [place] with   worsening anxiety and possible decompensation of schizophrenia.   Internal Medicine consultation was ordered by Dr. Arturo Melgoza for general medical evaluation.       Hx of paranoid schizophrenia  Anxiety: On PTA Paliperidone injection; last injection 12/30 per   patient. Was transferred from [facility] in   [place]. He reports increased frustration and stress due to his   change in insurance, a current birthday, as well as less social   support at home. No SI/HI. No hallucinations. CBC, CMP, TSH and   Lipid panel WNL.  -Management per psychiatry team.     No further medical intervention is  required at this time.   Medicine signing off. Please feel free to call with any   questions.     Courtney Causey PA-C  Hospitalist Service   Pager: [phone]     Reason for Admission:   Worsening anxiety and possible decompensation of schizophrenia          History of Present Illness:   History is obtained from the patient and medical record.     Aroldo Pat is a 39 year old year old man with a   history of schizophrenia who is admitted to station 10N after   transfer from [facility] in [place], [place] with   worsening anxiety and possible decompensation of schizophrenia.   Internal Medicine consultation was ordered by Dr. Arturo Melgoza for general medical evaluation.    Currently, patient denies any health concerns at this time. He   states he is here because he was feeling more frustrated and   anxious due to his [age] birthday, having less social support,   not having an 'Arms' worker, his co-pays increasing and his   insurance changing. He denies any SI/HI or hallicinations. He   states that he was previously on paliperidone injections which   are getting more expensive for him to pay for. He states he has   gained 20-30lbs over the last few months due to inactivity. He   cannot drive, so he states the winter and side walk conditions   have made it difficult for him to be active.     Otherwise, he denies any chest pain, SOB, dizziness,   lightheadedness, abdominal pain, nausea/vomiting or change in   bowel/bladder function. He denies any previous medical problems   including heart disease, HTN, HLD, diabetes or lung disease. He   smokes 10 cigarettes per day for 20 years. Is wanting to quit for   his [age] birthday. States the nicotine gum is helpful. He   denies any other alcohol or illicit drug use.           Review of Systems:   Constitutional: negative for fever/chills or recent weight   changes   Eyes: negative for vision changes   Ears/Nose/Throat: negative for ear  pain, sore throat, nasal or   sinus congestion   Cardiovascular: negative for chest pain or palpitations   Respiratory: negative for cough or shortness of breath   Gastrointestinal: negative for abdominal pain, N/V, diarrhea or   constipation   Genitourinary: negative for dysuria, urinary urgency or frequency     Musculoskeletal: negative for joint pain   Neurologic: negative for headaches or numbness, tingling,   weakness of extremities   Skin: negative for rashes or wounds   Endocrine: negative for polydipsia, polyphagia, polyuria;   negative for heat/cold intolerance           Past Medical History:   Reviewed and updated in Epic.  Past Medical History:   Diagnosis Date     Schizophrenia (H)              Past Surgical History:   Reviewed and updated in Epic.  Past Surgical History:   Procedure Laterality Date     wisdom teeth extraction               Social History:     Social History     Tobacco Use     Smoking status: Current Every Day Smoker     Packs/day: 0.50     Years: 20.00     Pack years: 10.00     Types: Cigarettes   Substance Use Topics     Alcohol use: No     Frequency: Never     Drug use: No     Currently lives in [facility] in [place], [place] by himself.   Has family nearby. Does not work.         Family History:   Reviewed and updated in Epic.  Family History   Adopted: Yes   Problem Relation Age of Onset     No Known Problems Mother      No Known Problems Father      No Known Problems Sister      No Known Problems Brother      No Known Problems Brother              Allergies:     Allergies   Allergen Reactions     Haloperidol              Medications:     Medications Prior to Admission   Medication Sig Dispense Refill Last Dose     paliperidone (INVEGA SUSTENNA) 156 MG/ML SUSP injection Inject   156 mg into the muscle every 28 days   1/1/2019 at n/a        Current Facility-Administered Medications   Medication     acetaminophen (TYLENOL) tablet 650 mg     alum & mag hydroxide-simethicone  "(MYLANTA ES/MAALOX  ES)   suspension 30 mL     bisacodyl (DULCOLAX) Suppository 10 mg     hydrOXYzine (ATARAX) tablet 25 mg     magnesium hydroxide (MILK OF MAGNESIA) suspension 30 mL     nicotine (NICORETTE) gum 2-4 mg     OLANZapine (zyPREXA) tablet 10 mg    Or     OLANZapine (zyPREXA) injection 10 mg     [START ON 1/30/2019] paliperidone (INVEGA SUSTENNA) injection   SUSP 156 mg     traZODone (DESYREL) tablet 50 mg            Physical Exam:   Blood pressure 115/70, pulse 71, temperature 97.8  F (36.6  C),   temperature source Tympanic, resp. rate 16, height 1.778 m (5'   10\"), weight 93 kg (205 lb 1.6 oz), SpO2 96 %.  Body mass index is 29.43 kg/m .  GENERAL: Alert and oriented x 3. In no acute distress.   HEENT: Anicteric sclera. Mucous membranes moist.   CV: RRR. S1, S2. No murmurs appreciated.   RESPIRATORY: Effort normal on room air. Lungs CTAB with no   wheezing, rales, rhonchi.   GI: Abdomen soft, non distended, non tender. No guarding,   rigidity or rebound tenderness.  MUSCULOSKELETAL: No joint swelling or tenderness.  NEUROLOGICAL: No focal deficits. Moves all extremities.   EXTREMITIES: No peripheral edema. Intact bilateral pedal pulses.   SKIN: No jaundice. No rashes.           Data:   CBC:  Recent Labs   Lab Test 01/28/19  0743   WBC 6.4   RBC 5.49   HGB 16.5   HCT 49.3   MCV 90   MCH 30.1   MCHC 33.5   RDW 12.5          CMP:  No results for input(s): NA, POTASSIUM, CHLORIDE, PALAK, CO2, BUN,   CR, GLC, AST, ALT, BILITOTAL, ALBUMIN, PROTTOTAL, ALKPHOS in the   last 12877 hours.    TSH:  TSH   Date Value Ref Range Status   01/28/2019 1.63 0.40 - 4.00 mU/L Final       Tox screen: n/a    Unresulted Labs Ordered in the Past 30 Days of this Admission     No orders found for last 61 day(s).                         Consults:   See above         Hospital Course:   Aroldo Pat was admitted to Station 10 with attending Arturo Melgoza MD as a voluntary patient. The patient was placed under " status 15 (15 minute checks) to ensure patient safety.     The patient presented with complaints that he believed his symptoms would worsen without his Invega, which he feared he may not be able to get due to cost. He recently lost his MA due to it lapsing. In the end, he presented in order to get admitted for MA application and Invega.    The patient did not appear to meet criteria for ongoing hospitalization when I met with him. He was not suicidal, homicidal, or reporting hallucinations. He indicated that the Invega controls his symptoms well. The patient primarily requested admission in order to get help with some social issues. The patient has significant help in the community in the form of providers and case workers.    The patient was sent from [facility] and did not have transportation back. His mother was unable to come get him on 1/29. The [employer] to [place] is at 6:45 AM. The patient's mother was contacted and agreed to purchase a ticket for him. We will provide a cab voucher to the bus station. Due to -50F wind chill, it is unsafe to discharge him this evening and have him wait.    None of the patient's medications were changed. He will follow-up with his outpatient team on getting a more affordable option to treat his mental illness.    Aroldo Pat was released to home. At the time of discharge Aroldo Pat was determined to not be a danger to himself or others.          Discharge Medications:     Current Discharge Medication List      CONTINUE these medications which have NOT CHANGED    Details   paliperidone (INVEGA SUSTENNA) 156 MG/ML SUSP injection Inject 156 mg into the muscle every 28 days. Next dose due 1/31/19                  Psychiatric Examination:   Appearance:  awake, alert, adequately groomed and casually dressed  Attitude:  cooperative  Eye Contact:  good  Mood:  anxious  Affect:  appropriate and in normal range and mood congruent  Speech:  clear, coherent  and normal prosody  Psychomotor Behavior:  no evidence of tardive dyskinesia, dystonia, or tics and intact station, gait and muscle tone  Thought Process:  goal oriented  Associations:  no loose associations  Thought Content:  no evidence of suicidal ideation or homicidal ideation and no evidence of psychotic thought  Insight:  fair  Judgment:  fair  Oriented to:  time, person, and place  Attention Span and Concentration:  intact  Recent and Remote Memory:  intact  Language: Able to name objects and Able to repeat phrases  Fund of Knowledge: appropriate  Muscle Strength and Tone: normal  Gait and Station: Normal         Discharge Plan:   Psychiatric Appointments: Dr. Rolando Capps on 2/5/19  Staff at Dr. Capps's office has arranged to  patient from bus station to bring him to [facility].    [facility] : Rakesh Radn    Attestation:  The patient has been seen and evaluated by me,  Arturo Melgoza MD   On 1/29/19, I saw the patient and performed the above examination, reviewed discharge medications, reviewed follow-up plan, and assessed safety for discharge. I spent greater than 30 minutes on these tasks.

## 2019-01-29 NOTE — PROGRESS NOTES
Pt was visible in the milieu for about half the shift, he was observed walking in the halls and sitting in the dinning room. Pt was isolative and withdrawn. Pt's affect was blunted/flat, his mood was calm. Pt denied all mental health concerns including depression, anxiety, SI, SIB, and hallucinations. Pt expressed concerns over board and lodging, he would like to talk to his  tomorrow. Pt also stated he would like his  to talk to his parents, KADEN's were signed for both parents. Overall pt had an ok shift, there were no major concerns.        01/28/19 2132   Behavioral Health   Hallucinations denies / not responding to hallucinations   Thinking distractable   Orientation person: oriented;place: oriented   Insight insight appropriate to situation   Judgement impaired   Eye Contact at examiner   Affect blunted, flat   Mood mood is calm   Physical Appearance/Attire attire appropriate to age and situation   Hygiene well groomed   Suicidality (denies)   1. Wish to be Dead No   2. Non-Specific Active Suicidal Thoughts  No   Self Injury other (see comment)  (denies)   Elopement (no statements made, none observed)   Activity isolative;withdrawn   Speech clear;coherent   Psychomotor / Gait balanced;steady   Activities of Daily Living   Hygiene/Grooming independent   Oral Hygiene independent   Dress independent   Room Organization independent

## 2019-01-30 PROCEDURE — H2032 ACTIVITY THERAPY, PER 15 MIN: HCPCS

## 2019-01-30 PROCEDURE — 99231 SBSQ HOSP IP/OBS SF/LOW 25: CPT | Performed by: PSYCHIATRY & NEUROLOGY

## 2019-01-30 PROCEDURE — 12400001 ZZH R&B MH UMMC

## 2019-01-30 PROCEDURE — A9270 NON-COVERED ITEM OR SERVICE: HCPCS | Mod: GY | Performed by: PSYCHIATRY & NEUROLOGY

## 2019-01-30 PROCEDURE — 25000132 ZZH RX MED GY IP 250 OP 250 PS 637: Mod: GY | Performed by: PSYCHIATRY & NEUROLOGY

## 2019-01-30 RX ADMIN — NICOTINE POLACRILEX 2 MG: 2 GUM, CHEWING BUCCAL at 07:19

## 2019-01-30 RX ADMIN — NICOTINE POLACRILEX 2 MG: 2 GUM, CHEWING BUCCAL at 09:37

## 2019-01-30 RX ADMIN — NICOTINE POLACRILEX 4 MG: 2 GUM, CHEWING BUCCAL at 13:44

## 2019-01-30 RX ADMIN — NICOTINE POLACRILEX 4 MG: 2 GUM, CHEWING BUCCAL at 18:18

## 2019-01-30 RX ADMIN — NICOTINE POLACRILEX 4 MG: 2 GUM, CHEWING BUCCAL at 16:25

## 2019-01-30 RX ADMIN — NICOTINE POLACRILEX 4 MG: 2 GUM, CHEWING BUCCAL at 11:19

## 2019-01-30 ASSESSMENT — ACTIVITIES OF DAILY LIVING (ADL)
DRESS: INDEPENDENT;STREET CLOTHES
LAUNDRY: WITH SUPERVISION
HYGIENE/GROOMING: INDEPENDENT
ORAL_HYGIENE: INDEPENDENT

## 2019-01-30 NOTE — PROGRESS NOTES
"   01/30/19 1700   General Information   Art Directive other (see comments)   Task Orientation    Task orientation skills calm and focused;works independently   Social Interaction   Social interaction skills maintains boundaries;shares appropriately   Product/Content   Product/Content pride in finished product;spontaneous and free image   AT directive was to create a self-compassion themed image prompted by a card from \"The Self-Compassion Deck\" a therapeutic tool with mindfulness-based practices.    Pt chose not to use card prompt. Pt instead self initiated a landscape drawing with a single tree. Pt chose not to share details about the image.   Pt was somewhat preoccupied with scheduling the details of his transportation after discharge but was able to focus on artwork when prompted which appeared to be calming and therapeutic for pt. Pt did share that he liked working with the oil pastels.  Pts mood was calm.  "

## 2019-01-30 NOTE — DISCHARGE INSTRUCTIONS
Behavioral Discharge Planning and Instructions      Summary:  You were admitted on 1/27/2019  due to Schizophrenia.  You were treated by Dr. Alonzo Hernandez MD; Dr. TAMRA Melgoza and discharged on 2/1/19 from Station 10N to be cabbed to the [employer] to  your ticket at [facility] and return to your home in [place], [place]    Ticket Pickup Password: [id number] + Your ID  Confirmation #[id number]    Principal Diagnosis: Schizophrenia, Paranoid Type    Health Care Follow-up Appointments:     [facility]   Psychiatrist Dr. Rolando Capps - New Appt Tuesday, Feb 5th at 10am  [address] [facility]  [place], [place]  [phone]  [phone]    [facility]  Rakesh Keyona [phone].  Rakesh is aware that you are returning home today.  Please call her when you get home!      Attend all scheduled appointments with your outpatient providers. Call at least 24 hours in advance if you need to reschedule an appointment to ensure continued access to your outpatient providers.   Major Treatments, Procedures and Findings:  You were provided with: a psychiatric assessment, assessed for medical stability, medication evaluation and/or management, group therapy and milieu management    Symptoms to Report: increased confusion, losing more sleep or mood getting worse    Early warning signs can include: increased depression or anxiety sleep disturbances increased thoughts or behaviors of suicide or self-harm  increased unusual thinking, such as paranoia or hearing voices    Safety and Wellness:  Take all medicines as directed.  Make no changes unless your doctor suggests them.      Follow treatment recommendations.  Refrain from alcohol and non-prescribed drugs.  If there is a concern for safety, call [phone].    Resources:   Crisis Intervention: 766.547.1726 or 776-656-8999 (TTY: 668.616.3026).  Call anytime for help.  [employer] Mental Illness (www.[place].sommer.org): 490.887.4655 or  877.534.6129.      The treatment team has appreciated the opportunity to work with you.     If you have any questions or concerns our unit number is [phone].

## 2019-01-30 NOTE — PLAN OF CARE
"48 hour nursing observation     [facility]  Paranoid schizophrenia (H)    Admit Date: 1/27/2019    Patient evaluation continues. Assessed mood,anxiety,thoughts and behavior. Patient is progressing towards goals. Patient is encouraged to participate in groups and assisted to develop healthy coping skills.      /75   Pulse 84   Temp 97  F (36.1  C) (Oral)   Resp 16   Ht 1.778 m (5' 10\")   Wt 93.6 kg (206 lb 6.4 oz)   SpO2 96%   BMI 29.62 kg/m      Patient reports depression level of 0 and anxiety level of  3 with 10 being the worst. Patient's affect is is blunted but does brighten some upon approach. Patient denies SI, denies SIB, denies HI and pt states he does feel hopeful about the future. Patient states concentration is \"good\", tracking well and denies racing thoughts.  Patient denies auditory or visual hallucinations. Patient reports sleeping 10 hours last night and \"eating good\".      Patient is compliant with medications and side effects reported are none. Patient visible on unit, quiet, pleasant and cooperative.  Pt was going to be discharged today back to his apt in [place] via bus but  we were not able to get him to the bus depot in [facility] today because the cabs are running 3-4 hours behind due to the extreme cold.  Dr Melgoza notified and discharge order for today cancelled.  ADLs are adequate.  Refer to daily team meeting notes for individualized plan of care. Nursing will continue to observe and assess.          "

## 2019-01-30 NOTE — PROGRESS NOTES
The patient's mother rescheduled patient go home to [place] on Friday, 2/1 on the [employer] Bus which leaves the [employer] [place].  His Password is [id number] and Conf# [id number], He will need to be at bus depot by 10:30am and then  the ticket at [facility].  [facility]  Rakesh Rand was informed he would come home on Friday all due to very inclement weather.

## 2019-01-30 NOTE — PROGRESS NOTES
"   01/29/19 1830   Behavioral Health   Hallucinations denies / not responding to hallucinations   Thinking poor concentration   Orientation date: oriented;time: oriented;place: oriented   Memory baseline memory   Insight insight appropriate to situation   Judgement impaired   Eye Contact at examiner   Affect full range affect   Mood mood is calm   Physical Appearance/Attire attire appropriate to age and situation   Hygiene neglected grooming - unclean body, hair, teeth   Suicidality (denied )   1. Wish to be Dead No   2. Non-Specific Active Suicidal Thoughts  No   Self Injury (denied )   Elopement (denied )   Activity withdrawn;isolative   Speech coherent;clear   Medication Sensitivity no observed side effects;no stated side effects   Psychomotor / Gait balanced;steady   Psycho Education   Type of Intervention 1:1 intervention   Response participates, initiates socially appropriate   Hours 0.5   Activities of Daily Living   Hygiene/Grooming independent   Oral Hygiene independent   Dress scrubs (behavioral health);street clothes   Laundry with supervision   Room Organization independent   Activity   Activity Assistance Provided independent     Pt was fairly calm and cooperative with full range affect, visibile in milieu throughout the shift, pacing, isolative and withdrawn from peers, minimally social with others. He is \"optimistic\" about the future and he is looking forward to discharge tomorrow morning. He thinks the medication is working and stated that \"he feels much better now\". He endorsed minor anxiety, denied depression, paranoid thoughts, psychotic symptoms and SI/SIB. No behavioral issues during this shift. No major concerns from the pt. His appetite was good and sleeping and napping well.    "

## 2019-01-30 NOTE — PROGRESS NOTES
"[facility], [place]   Psychiatric Progress Note  Hospital Day: 3        Interim History:   The patient's care was discussed with the treatment team during the daily team meeting and/or staff's chart notes were reviewed.  Staff report patient was not able to be discharged. The cab company was running 4 hours late due to extreme cold whether and thus he was not able to catch his bus. Patient's mother will obtain ticket for Friday AM due to ongoing -50F windchills tomorrow.    Upon interview, the patient reports that he is doing well. He has no questions or concerns. He feels ready to go. He has an appointment for injection on Friday.    Psychiatric Symptoms: denies hallucinations. No suicidal or homicidal thoughts.    Medication side effects reported: None    Other issues reported by patient: None         Medications:       [START ON 2/6/2019] paliperidone  156 mg Intramuscular Q28 Days          Allergies:     Allergies   Allergen Reactions     Haloperidol           Labs:   No results found for this or any previous visit (from the past 48 hour(s)).       Psychiatric Examination:     /75   Pulse 84   Temp 97  F (36.1  C) (Oral)   Resp 16   Ht 1.778 m (5' 10\")   Wt 93.6 kg (206 lb 6.4 oz)   SpO2 96%   BMI 29.62 kg/m    Weight is 206 lbs 6.4 oz  Body mass index is 29.62 kg/m .    Orthostatic Vitals       Most Recent      Sitting Orthostatic /75 01/30 0726    Sitting Orthostatic Pulse (bpm) 89 01/30 0726    Standing Orthostatic /78 01/30 0726    Standing Orthostatic Pulse (bpm) 82 01/30 0726            Appearance: awake, alert, adequately groomed and dressed in hospital scrubs  Attitude:  cooperative  Eye Contact:  good  Mood:  euthymic  Affect:  intensity is blunted  Speech:  clear, coherent and normal prosody  Language: fluent and intact in English  Psychomotor, Gait, Musculoskeletal:  no evidence of tardive dyskinesia, dystonia, or tics and intact station, gait and " muscle tone  Throught Process:  goal oriented  Associations:  no loose associations  Thought Content:  no evidence of suicidal ideation or homicidal ideation and no evidence of psychotic thought  Insight:  fair  Judgement:  intact  Oriented to:  time, person, and place  Attention Span and Concentration:  intact  Recent and Remote Memory:  intact  Fund of Knowledge:  appropriate    Clinical Global Impressions  First:  Considering your total clinical experience with this particular patient population, how severe are the patient's symptoms at this time?: 7 (01/27/19 0820)  Compared to the patient's condition at the START of treatment, this patient's condition is:: 4 (01/27/19 0820)  Most recent:  Considering your total clinical experience with this particular patient population, how severe are the patient's symptoms at this time?: 3 (01/30/19 1050)  Compared to the patient's condition at the START of treatment, this patient's condition is:: 3 (01/30/19 1050)           Precautions:     Behavioral Orders   Procedures     Code 1 - Restrict to Unit     Routine Programming     As clinically indicated     Status 15     Every 15 minutes.          Diagnoses:   Schizophrenia, chronic, paranoid type           Assessment & Plan:   Assessment and hospital summary:  39-year-old man admitted from outside facility after he presented with reports of worsening psychiatric symptoms. Upon evaluation here, it was determined that he was mostly anxious about access to Invega due to sudden increase in out of pocket cost (nearly $400 per injection.) He worried this would lead to decompensation of symptoms and indicated to staff here that the ER physician told him he could get his injection in the hospital and that we could help with getting him setup with MA. After CTC informed him that he would need to work with his county worker on the MA and I informed him that we cannot provide the Invega due to formulary restrictions, he reported that he  had no other issues that needed addressing.     Target psychiatric symptoms and interventions:  Schizophrenia - continue with Invega. Next injection as outpatient on 2/1 after discharge.    Medical Problems and Treatments:  None acute    Behavioral/Psychological/Social:  Encourage unit programming        Disposition Plan   Reason for ongoing admission: Patient lives far from hospital and must travel by bus home. Extreme cold weather has made arranging this in a safe way extremely difficult. Patient has no other means of transportation and discharge to street or shelter is unsafe due to weather.  Discharge location: home with self-care  Discharge Medications: not needed  Follow-up Appointments: scheduled  Legal Status: voluntary  Entered by: Arturo Melgoza on 1/30/2019 at 3:52 PM

## 2019-01-31 PROCEDURE — 25000132 ZZH RX MED GY IP 250 OP 250 PS 637: Mod: GY | Performed by: PSYCHIATRY & NEUROLOGY

## 2019-01-31 PROCEDURE — A9270 NON-COVERED ITEM OR SERVICE: HCPCS | Mod: GY | Performed by: PSYCHIATRY & NEUROLOGY

## 2019-01-31 PROCEDURE — 12400001 ZZH R&B MH UMMC

## 2019-01-31 PROCEDURE — G0177 OPPS/PHP; TRAIN & EDUC SERV: HCPCS

## 2019-01-31 RX ADMIN — NICOTINE POLACRILEX 4 MG: 2 GUM, CHEWING BUCCAL at 16:23

## 2019-01-31 RX ADMIN — NICOTINE POLACRILEX 4 MG: 2 GUM, CHEWING BUCCAL at 18:26

## 2019-01-31 RX ADMIN — NICOTINE POLACRILEX 4 MG: 2 GUM, CHEWING BUCCAL at 12:39

## 2019-01-31 RX ADMIN — NICOTINE POLACRILEX 2 MG: 2 GUM, CHEWING BUCCAL at 08:59

## 2019-01-31 RX ADMIN — NICOTINE POLACRILEX 4 MG: 2 GUM, CHEWING BUCCAL at 10:35

## 2019-01-31 RX ADMIN — NICOTINE POLACRILEX 4 MG: 2 GUM, CHEWING BUCCAL at 07:48

## 2019-01-31 RX ADMIN — NICOTINE POLACRILEX 4 MG: 2 GUM, CHEWING BUCCAL at 17:41

## 2019-01-31 RX ADMIN — NICOTINE POLACRILEX 4 MG: 2 GUM, CHEWING BUCCAL at 14:29

## 2019-01-31 ASSESSMENT — ACTIVITIES OF DAILY LIVING (ADL)
HYGIENE/GROOMING: INDEPENDENT
HYGIENE/GROOMING: INDEPENDENT
DRESS: SCRUBS (BEHAVIORAL HEALTH);STREET CLOTHES
LAUNDRY: WITH SUPERVISION
DRESS: INDEPENDENT
ORAL_HYGIENE: INDEPENDENT
ORAL_HYGIENE: INDEPENDENT
LAUNDRY: WITH SUPERVISION

## 2019-01-31 ASSESSMENT — MIFFLIN-ST. JEOR: SCORE: 1855.65

## 2019-01-31 NOTE — PROGRESS NOTES
Dr. Scruggs, Psychiatrist from [facility] called to discuss the discharge plan.  He plans to call Aroldo on the unit today and reassure him that they will have his Invega Sustenna on Tuesday at his scheduled appt.  Let Dr. Scruggs know that his CM Rakesh Rand is also aware that patient is arriving home tomorrow afternoon. This writer explained to Dr. Scruggs that patient went to [facility] expecting hospitalization there and they transferred him up to [facility] to [facility]. Let Dr. Scruggs know that the patient has been worried about the high cost of Invega Sustenna since he lost his MA and that he is also trying to get back into [facility] [place].  His mother had told me that he did live at [facility] for a very long time and did well there but then insisted upon getting his own apartment and now feels he needs more help and structure and wants to move back in but does not have open MA.  Dr. Scruggs said that  would be meeting with him next week.

## 2019-01-31 NOTE — PROGRESS NOTES
Called the [employer] that hospital contract with again today to ask if their cabs will be on time tomorrow.  They could not tell me they would be on time.  I called [employer] and explained that we need to get a patient to the [facility] from [place].  The person I made a reservation with said they would be here to  patient at 9:30am and take him to the bus depot. Discharge time/information on the brainboard.

## 2019-01-31 NOTE — PROGRESS NOTES
01/30/19 2301   Behavioral Health   Hallucinations denies / not responding to hallucinations   Thinking distractable   Orientation person: oriented;place: oriented;date: oriented;time: oriented   Memory baseline memory   Insight poor   Judgement impaired   Eye Contact at examiner   Affect blunted, flat   Mood mood is calm   Physical Appearance/Attire untidy;disheveled   Hygiene other (see comment)  (fair)   Suicidality other (see comments)  (denies currently)   1. Wish to be Dead No   2. Non-Specific Active Suicidal Thoughts  No   Self Injury other (see comment)  (denies currently)   Elopement (no concerns)   Activity withdrawn   Speech clear;coherent   Medication Sensitivity no stated side effects   Psychomotor / Gait balanced;steady     Pt had uneventful shift. Mood was calm with blunted affect. Denies SI/SIB. Denies hallucinations/paranoia/psychotic symptoms. Pt was intermittently visible in milieu and withdrawn. Pt reports that he was suppose to discharge today but due to inclement weather his discharge has been rescheduled for Friday. No behavioral concerns.

## 2019-02-01 VITALS
HEIGHT: 70 IN | BODY MASS INDEX: 29.65 KG/M2 | DIASTOLIC BLOOD PRESSURE: 63 MMHG | OXYGEN SATURATION: 96 % | WEIGHT: 207.1 LBS | SYSTOLIC BLOOD PRESSURE: 109 MMHG | RESPIRATION RATE: 16 BRPM | HEART RATE: 72 BPM | TEMPERATURE: 96.8 F

## 2019-02-01 PROCEDURE — 99239 HOSP IP/OBS DSCHRG MGMT >30: CPT | Performed by: PSYCHIATRY & NEUROLOGY

## 2019-02-01 NOTE — PROGRESS NOTES
Patient verbalizes readiness for discharge. Denies suicidal ideation, plan, intent. Has adequate coping skills and support network in place. After visit summary reviewed with patient including follow up appointments and medication instructions. Patient verbalizes understanding and reports no further questions. All belongings returned to patient. Patient will be sent via cab to the bus station to return to home. Patient is discharged at this time.

## 2019-02-01 NOTE — PROGRESS NOTES
01/31/19 2133   Behavioral Health   Hallucinations denies / not responding to hallucinations   Thinking poor concentration   Orientation time: oriented;date: oriented;place: oriented;person: oriented   Memory baseline memory   Insight insight appropriate to situation   Judgement impaired   Eye Contact at examiner   Affect full range affect   Mood mood is calm   Physical Appearance/Attire attire appropriate to age and situation   Hygiene neglected grooming - unclean body, hair, teeth   Suicidality (denied )   1. Wish to be Dead No   2. Non-Specific Active Suicidal Thoughts  No   Self Injury (denied )   Elopement (denied )   Activity withdrawn;isolative   Speech coherent;clear   Medication Sensitivity no stated side effects;no observed side effects   Psychomotor / Gait balanced;steady   Psycho Education   Type of Intervention 1:1 intervention   Response participates, initiates socially appropriate   Hours 0.5   Activities of Daily Living   Hygiene/Grooming independent   Oral Hygiene independent   Dress scrubs (behavioral health);street clothes   Laundry with supervision   Room Organization independent   Activity   Activity Assistance Provided independent     Pt was calm and cooperative with blunted mood affect. He was visibile in milieu pacing in hallway, isolative withdrawn, and minimally interacts with others. He denied all mental health symptoms including SI/SIB. He is looking forward to discharge tomorrow. His appetite was good and sleeping and napping well. No behavioral issues. No major concerns from the pt.

## 2019-02-01 NOTE — PROGRESS NOTES
Psychiatric Discharge Summary    Aroldo Pat MRN# [id number]   Age: 39 year old YOB: 1980     Date of Admission:  1/27/2019  Date of Discharge:  2/1/2019 at 5 AM  Admitting Physician:  Arturo Melgoza MD  Discharge Physician:  Arturo Melgoza MD         Event Leading to Hospitalization:   This patient was admitted to station 10 from [facility] in [place] due to concerns about possible decompensation.       See Admission note by Cesia Jay MD on 1/27/19 for additional details.          Diagnoses:     Schizophrenia, chronic paranoid  Anxiety, unspecified         Labs:     Results for orders placed or performed during the hospital encounter of 01/27/19   Lipid panel   Result Value Ref Range    Cholesterol 158 <200 mg/dL    Triglycerides 141 <150 mg/dL    HDL Cholesterol 43 >39 mg/dL    LDL Cholesterol Calculated 87 <100 mg/dL    Non HDL Cholesterol 115 <130 mg/dL   TSH with free T4 reflex   Result Value Ref Range    TSH 1.63 0.40 - 4.00 mU/L   Comprehensive metabolic panel   Result Value Ref Range    Sodium 140 133 - 144 mmol/L    Potassium 4.8 3.4 - 5.3 mmol/L    Chloride 108 94 - 109 mmol/L    Carbon Dioxide 27 20 - 32 mmol/L    Anion Gap 5 3 - 14 mmol/L    Glucose 95 70 - 99 mg/dL    Urea Nitrogen 17 7 - 30 mg/dL    Creatinine 1.04 0.66 - 1.25 mg/dL    GFR Estimate 89 >60 mL/min/[1.73_m2]    GFR Estimate If Black >90 >60 mL/min/[1.73_m2]    Calcium 9.0 8.5 - 10.1 mg/dL    Bilirubin Total 0.4 0.2 - 1.3 mg/dL    Albumin 3.8 3.4 - 5.0 g/dL    Protein Total 7.0 6.8 - 8.8 g/dL    Alkaline Phosphatase 74 40 - 150 U/L    ALT 21 0 - 70 U/L    AST 17 0 - 45 U/L   CBC with platelets differential   Result Value Ref Range    WBC 6.4 4.0 - 11.0 10e9/L    RBC Count 5.49 4.4 - 5.9 10e12/L    Hemoglobin 16.5 13.3 - 17.7 g/dL    Hematocrit 49.3 40.0 - 53.0 %    MCV 90 78 - 100 fl    MCH 30.1 26.5 - 33.0 pg    MCHC 33.5 31.5 - 36.5 g/dL    RDW 12.5 10.0 - 15.0 %    Platelet Count  271 150 - 450 10e9/L    Diff Method Automated Method     % Neutrophils 58.4 %    % Lymphocytes 32.2 %    % Monocytes 7.1 %    % Eosinophils 1.6 %    % Basophils 0.5 %    % Immature Granulocytes 0.2 %    Nucleated RBCs 0 0 /100    Absolute Neutrophil 3.7 1.6 - 8.3 10e9/L    Absolute Lymphocytes 2.1 0.8 - 5.3 10e9/L    Absolute Monocytes 0.5 0.0 - 1.3 10e9/L    Absolute Eosinophils 0.1 0.0 - 0.7 10e9/L    Absolute Basophils 0.0 0.0 - 0.2 10e9/L    Abs Immature Granulocytes 0.0 0 - 0.4 10e9/L    Absolute Nucleated RBC 0.0    Internal Medicine Adult IP Consult for [facility] in [facility]: Patient to be seen: Routine within 24 hrs; Call back #: [phone]; Will need H&P; Consultant may enter orders: Yes    Narrative    Courtney Causey PA-C     1/28/2019 10:36 AM      Internal Medicine Initial Visit      Aroldo Pat MRN# [id number]   YOB: 1980 Age: 39 year old   Date of Admission: 1/27/2019  PCP: No primary care provider on file.    Referring Provider: Behavioral Health - Arturo Melgoza MD  Reason for Visit: General Medical Evaluation          Assessment and Recommendations:   Aroldo Pat is a 39 year old year old man with a   history of schizophrenia who is admitted to station 10 after   transfer from [facility] in [place], [place] with   worsening anxiety and possible decompensation of schizophrenia.   Internal Medicine consultation was ordered by Dr. Arturo Melgoza for general medical evaluation.       Hx of paranoid schizophrenia  Anxiety: On PTA Paliperidone injection; last injection 12/30 per   patient. Was transferred from [facility] in   [place]. He reports increased frustration and stress due to his   change in insurance, a current birthday, as well as less social   support at home. No SI/HI. No hallucinations. CBC, CMP, TSH and   Lipid panel WNL.  -Management per psychiatry team.     No further medical intervention is required  at this time.   Medicine signing off. Please feel free to call with any   questions.     Courtney Causey PA-C  Hospitalist Service   Pager: [phone]     Reason for Admission:   Worsening anxiety and possible decompensation of schizophrenia          History of Present Illness:   History is obtained from the patient and medical record.     Aroldo Pat is a 39 year old year old man with a   history of schizophrenia who is admitted to station 10N after   transfer from [facility] in [place], [place] with   worsening anxiety and possible decompensation of schizophrenia.   Internal Medicine consultation was ordered by Dr. Arturo Melgoza for general medical evaluation.    Currently, patient denies any health concerns at this time. He   states he is here because he was feeling more frustrated and   anxious due to his for[age] birthday, having less social support,   not having an 'Arms' worker, his co-pays increasing and his   insurance changing. He denies any SI/HI or hallicinations. He   states that he was previously on paliperidone injections which   are getting more expensive for him to pay for. He states he has   gained 20-30lbs over the last few months due to inactivity. He   cannot drive, so he states the winter and side walk conditions   have made it difficult for him to be active.     Otherwise, he denies any chest pain, SOB, dizziness,   lightheadedness, abdominal pain, nausea/vomiting or change in   bowel/bladder function. He denies any previous medical problems   including heart disease, HTN, HLD, diabetes or lung disease. He   smokes 10 cigarettes per day for 20 years. Is wanting to quit for   his [age] birthday. States the nicotine gum is helpful. He   denies any other alcohol or illicit drug use.           Review of Systems:   Constitutional: negative for fever/chills or recent weight   changes   Eyes: negative for vision changes   Ears/Nose/Throat: negative for ear pain,  sore throat, nasal or   sinus congestion   Cardiovascular: negative for chest pain or palpitations   Respiratory: negative for cough or shortness of breath   Gastrointestinal: negative for abdominal pain, N/V, diarrhea or   constipation   Genitourinary: negative for dysuria, urinary urgency or frequency     Musculoskeletal: negative for joint pain   Neurologic: negative for headaches or numbness, tingling,   weakness of extremities   Skin: negative for rashes or wounds   Endocrine: negative for polydipsia, polyphagia, polyuria;   negative for heat/cold intolerance           Past Medical History:   Reviewed and updated in Epic.  Past Medical History:   Diagnosis Date     Schizophrenia (H)              Past Surgical History:   Reviewed and updated in Epic.  Past Surgical History:   Procedure Laterality Date     wisdom teeth extraction               Social History:     Social History     Tobacco Use     Smoking status: Current Every Day Smoker     Packs/day: 0.50     Years: 20.00     Pack years: 10.00     Types: Cigarettes   Substance Use Topics     Alcohol use: No     Frequency: Never     Drug use: No     Currently lives in [facility] in [place], [place] by himself.   Has family nearby. Does not work.         Family History:   Reviewed and updated in Epic.  Family History   Adopted: Yes   Problem Relation Age of Onset     No Known Problems Mother      No Known Problems Father      No Known Problems Sister      No Known Problems Brother      No Known Problems Brother              Allergies:     Allergies   Allergen Reactions     Haloperidol              Medications:     Medications Prior to Admission   Medication Sig Dispense Refill Last Dose     paliperidone (INVEGA SUSTENNA) 156 MG/ML SUSP injection Inject   156 mg into the muscle every 28 days   1/1/2019 at n/a        Current Facility-Administered Medications   Medication     acetaminophen (TYLENOL) tablet 650 mg     alum & mag hydroxide-simethicone (MYLANTA  "ES/MAALOX  ES)   suspension 30 mL     bisacodyl (DULCOLAX) Suppository 10 mg     hydrOXYzine (ATARAX) tablet 25 mg     magnesium hydroxide (MILK OF MAGNESIA) suspension 30 mL     nicotine (NICORETTE) gum 2-4 mg     OLANZapine (zyPREXA) tablet 10 mg    Or     OLANZapine (zyPREXA) injection 10 mg     [START ON 1/30/2019] paliperidone (INVEGA SUSTENNA) injection   SUSP 156 mg     traZODone (DESYREL) tablet 50 mg            Physical Exam:   Blood pressure 115/70, pulse 71, temperature 97.8  F (36.6  C),   temperature source Tympanic, resp. rate 16, height 1.778 m (5'   10\"), weight 93 kg (205 lb 1.6 oz), SpO2 96 %.  Body mass index is 29.43 kg/m .  GENERAL: Alert and oriented x 3. In no acute distress.   HEENT: Anicteric sclera. Mucous membranes moist.   CV: RRR. S1, S2. No murmurs appreciated.   RESPIRATORY: Effort normal on room air. Lungs CTAB with no   wheezing, rales, rhonchi.   GI: Abdomen soft, non distended, non tender. No guarding,   rigidity or rebound tenderness.  MUSCULOSKELETAL: No joint swelling or tenderness.  NEUROLOGICAL: No focal deficits. Moves all extremities.   EXTREMITIES: No peripheral edema. Intact bilateral pedal pulses.   SKIN: No jaundice. No rashes.           Data:   CBC:  Recent Labs   Lab Test 01/28/19  0743   WBC 6.4   RBC 5.49   HGB 16.5   HCT 49.3   MCV 90   MCH 30.1   MCHC 33.5   RDW 12.5          CMP:  No results for input(s): NA, POTASSIUM, CHLORIDE, PALAK, CO2, BUN,   CR, GLC, AST, ALT, BILITOTAL, ALBUMIN, PROTTOTAL, ALKPHOS in the   last 19997 hours.    TSH:  TSH   Date Value Ref Range Status   01/28/2019 1.63 0.40 - 4.00 mU/L Final       Tox screen: n/a    Unresulted Labs Ordered in the Past 30 Days of this Admission     No orders found for last 61 day(s).                         Consults:   See above         Hospital Course:   Aroldo Pat was admitted to Station 10 with attending Arturo Melgoza MD as a voluntary patient. The patient was placed under status 15 " (15 minute checks) to ensure patient safety.     The patient presented with complaints that he believed his symptoms would worsen without his Invega, which he feared he may not be able to get due to cost. He recently lost his MA due to it lapsing. In the end, he presented in order to get admitted for MA application and Invega.    The patient did not appear to meet criteria for ongoing hospitalization when I met with him initially. He was not suicidal, homicidal, or reporting hallucinations. He indicated that the Invega controls his symptoms well. The patient primarily requested admission in order to get help with some social issues. The patient has significant help in the community in the form of providers and case workers.    The original plan was to discharge him via bus on 1/30, however, due to the extreme cold temperatures here, the cab to the bus station was not able to make it in time. To avoid this happening again, his discharge was scheduled for 2/1, when the temperature was around 0 F instead of -30 F. His mother purchased a ticket for him to return home and we provided a cab to the bus station.    None of the patient's medications were changed. He will follow-up with his outpatient team on getting a more affordable option to treat his mental illness.    Aroldo Pat was released to home. At the time of discharge Aroldo Pat was determined to not be a danger to himself or others.          Discharge Medications:     Current Discharge Medication List      CONTINUE these medications which have NOT CHANGED    Details   paliperidone (INVEGA SUSTENNA) 156 MG/ML SUSP injection Inject 156 mg into the muscle every 28 days. Next dose due 1/31/19                  Psychiatric Examination:   Appearance:  awake, alert, adequately groomed and casually dressed  Attitude:  cooperative  Eye Contact:  good  Mood:  anxious  Affect:  appropriate and in normal range and mood congruent  Speech:  clear,  coherent and normal prosody  Psychomotor Behavior:  no evidence of tardive dyskinesia, dystonia, or tics and intact station, gait and muscle tone  Thought Process:  goal oriented  Associations:  no loose associations  Thought Content:  no evidence of suicidal ideation or homicidal ideation and no evidence of psychotic thought  Insight:  fair  Judgment:  fair  Oriented to:  time, person, and place  Attention Span and Concentration:  intact  Recent and Remote Memory:  intact  Language: Able to name objects and Able to repeat phrases  Fund of Knowledge: appropriate  Muscle Strength and Tone: normal  Gait and Station: Normal         Discharge Plan:   Psychiatric Appointments: Dr. Rolando Capps on 2/5/19  Staff at Dr. Capps's office has arranged to  patient from bus station to bring him to [facility].    [facility] : Rakesh Rand    Attestation:  The patient has been seen and evaluated by me,  Arturo Melgoza MD   On 2/1/19, I saw the patient and performed the above examination, reviewed discharge medications, reviewed follow-up plan, and assessed safety for discharge. I spent greater than 30 minutes on these tasks.